# Patient Record
(demographics unavailable — no encounter records)

---

## 2024-10-07 NOTE — HISTORY OF PRESENT ILLNESS
[de-identified] : 23 year old man with h/o ETD, bilateral OME and Trisomy 21 presents for follow-up. Dad reports his hearing has remained stable. Denies current otalgia, otorrhea, recent fevers/ear infections, tinnitus, dizziness, vertigo, headaches related to hearing.

## 2024-10-07 NOTE — DATA REVIEWED
[de-identified] : Essentially moderate to mild conductive HL to WNL, AU. Note mixed component at 500Hz, AU,  Type A tymp Ad, Type B tymp As. Excellent WRS, AU.- WIPI list used.

## 2024-10-07 NOTE — REASON FOR VISIT
[Subsequent Evaluation] : a subsequent evaluation for [Parent] : parent [FreeTextEntry2] : h/o ETD and bilateral OME

## 2025-04-07 NOTE — HISTORY OF PRESENT ILLNESS
[de-identified] : 23 year old man with h/o ETD, bilateral OME and Trisomy 21 presents for follow-up. Mother reports his hearing is better. Here for wax cleaning. Denies current otalgia, otorrhea, recent fevers/ear infections, tinnitus, dizziness, vertigo, headaches related to hearing.

## 2025-04-07 NOTE — PHYSICAL EXAM
[de-identified] : wax removed AD - TM normal - AS MT in place slight perf around tube [Normal] : no abnormal secretions

## 2025-04-15 NOTE — ASSESSMENT
[FreeTextEntry1] : Patient presents with symptoms and clinical findings consistent with a likely viral URI, with no evidence of respiratory compromise or bacterial superinfection at this time. POCT Flu/COVID negative.

## 2025-04-15 NOTE — ADDENDUM
[FreeTextEntry1] : Called patient's father on 4/15/2025. RVP is negative. He reports that patient is doing significantly better since our last appointment with over the counter medications. Reinforced above plan and return precautions. All questions answered

## 2025-04-15 NOTE — PHYSICAL EXAM
[No Acute Distress] : no acute distress [Well Nourished] : well nourished [Well Developed] : well developed [Normal Sclera/Conjunctiva] : normal sclera/conjunctiva [EOMI] : extraocular movements intact [Normal Outer Ear/Nose] : the outer ears and nose were normal in appearance [Normal Oropharynx] : the oropharynx was normal [Normal TMs] : both tympanic membranes were normal [Supple] : supple [No Respiratory Distress] : no respiratory distress  [No Accessory Muscle Use] : no accessory muscle use [Clear to Auscultation] : lungs were clear to auscultation bilaterally [Soft] : abdomen soft [Non Tender] : non-tender [Non-distended] : non-distended [No Masses] : no abdominal mass palpated [Grossly Normal Strength/Tone] : grossly normal strength/tone [No Rash] : no rash [Coordination Grossly Intact] : coordination grossly intact [No Focal Deficits] : no focal deficits [Normal Gait] : normal gait [de-identified] : Tachycardia likely due to underlying URI symptoms and poor po intake, regular rhythm

## 2025-04-15 NOTE — REVIEW OF SYSTEMS
[Nasal Discharge] : nasal discharge [Cough] : cough [Abdominal Pain] : abdominal pain [Negative] : Neurological [Earache] : no earache [Sore Throat] : no sore throat [Chest Pain] : no chest pain [Shortness Of Breath] : no shortness of breath [Wheezing] : no wheezing [Nausea] : no nausea [Constipation] : no constipation [Diarrhea] : no diarrhea [Vomiting] : no vomiting [FreeTextEntry2] : See HPI  [FreeTextEntry7] : generalized abdominal discomfort  [FreeTextEntry8] : no decrease urine output

## 2025-04-15 NOTE — PLAN
[FreeTextEntry1] : #Viral URI - Follow up RVP  - Discussed the importance of oral hydration with water and oral rehydration solutions (Celalyte, Pedialyte, Enfalyte) - Recommend supportive care including rest, OTC Acetaminophen/NSAIDs PRN for pain/fever, OTC throat lozenges, warm saltwater gargles, honey prn throat discomfort, Saline nasal irrigation and steam inhalation prn congestion. Recommend taking OTC cough medication with Guaifenesin - Discussed importance of proper hygiene and transmission prevention - RTC if symptoms fail to improve - Strict ED precautions given including difficulty breathing, chest pain, persistent fever, altered mental status  - Monitor symptoms of dehydration (dry mouth, decreased urine output, dizziness)

## 2025-04-15 NOTE — HISTORY OF PRESENT ILLNESS
[FreeTextEntry8] : 23 year old with down syndrome, prediabetes, recurrent chronic otitis media here for URI symptoms x 4 days. History obtained by father as patient is nonverbal. Symptoms include productive cough, poor appetite, nasal congestion, chills, subjective fevers Tmax 99 on forehead and on stomach 103F. No sore throat, shortness of breath, chest pain, diarrhea, vomiting. He has poor oral and po intake but normal urine output.Treatments tried: a little bit of Dayquil. Sick contact: none. History of childhood asthma but no asthma symptoms during adult years.

## 2025-04-15 NOTE — PHYSICAL EXAM
[No Acute Distress] : no acute distress [Well Nourished] : well nourished [Well Developed] : well developed [Normal Sclera/Conjunctiva] : normal sclera/conjunctiva [EOMI] : extraocular movements intact [Normal Outer Ear/Nose] : the outer ears and nose were normal in appearance [Normal Oropharynx] : the oropharynx was normal [Normal TMs] : both tympanic membranes were normal [Supple] : supple [No Respiratory Distress] : no respiratory distress  [No Accessory Muscle Use] : no accessory muscle use [Clear to Auscultation] : lungs were clear to auscultation bilaterally [Soft] : abdomen soft [Non Tender] : non-tender [Non-distended] : non-distended [No Masses] : no abdominal mass palpated [Grossly Normal Strength/Tone] : grossly normal strength/tone [No Rash] : no rash [Coordination Grossly Intact] : coordination grossly intact [No Focal Deficits] : no focal deficits [Normal Gait] : normal gait [de-identified] : Tachycardia likely due to underlying URI symptoms and poor po intake, regular rhythm

## 2025-06-11 NOTE — PHYSICAL EXAM
[Normal Outer Ear/Nose] : the outer ears and nose were normal in appearance [Normal Oropharynx] : the oropharynx was normal [No Lymphadenopathy] : no lymphadenopathy [Supple] : supple [Normal] : normal rate, regular rhythm, normal S1 and S2 and no murmur heard [No Edema] : there was no peripheral edema [No Extremity Clubbing/Cyanosis] : no extremity clubbing/cyanosis [Soft] : abdomen soft [Non Tender] : non-tender [Non-distended] : non-distended [No Masses] : no abdominal mass palpated [Normal Supraclavicular Nodes] : no supraclavicular lymphadenopathy [Normal Anterior Cervical Nodes] : no anterior cervical lymphadenopathy [Normal Gait] : normal gait [Normal Affect] : the affect was normal [Alert and Oriented x3] : oriented to person, place, and time [Normal Mood] : the mood was normal [Normal Insight/Judgement] : insight and judgment were intact [de-identified] : L TM erythematous, R TM normal

## 2025-06-11 NOTE — PHYSICAL EXAM
[Normal Outer Ear/Nose] : the outer ears and nose were normal in appearance [Normal Oropharynx] : the oropharynx was normal [No Lymphadenopathy] : no lymphadenopathy [Supple] : supple [Normal] : normal rate, regular rhythm, normal S1 and S2 and no murmur heard [No Edema] : there was no peripheral edema [No Extremity Clubbing/Cyanosis] : no extremity clubbing/cyanosis [Soft] : abdomen soft [Non Tender] : non-tender [Non-distended] : non-distended [No Masses] : no abdominal mass palpated [Normal Supraclavicular Nodes] : no supraclavicular lymphadenopathy [Normal Anterior Cervical Nodes] : no anterior cervical lymphadenopathy [Normal Gait] : normal gait [Normal Affect] : the affect was normal [Alert and Oriented x3] : oriented to person, place, and time [Normal Mood] : the mood was normal [Normal Insight/Judgement] : insight and judgment were intact [de-identified] : L TM erythematous, R TM normal

## 2025-06-11 NOTE — HEALTH RISK ASSESSMENT
[No] : In the past 12 months have you used drugs other than those required for medical reasons? No [0] : 2) Feeling down, depressed, or hopeless: Not at all (0) [PHQ-2 Negative - No further assessment needed] : PHQ-2 Negative - No further assessment needed [Never] : Never [HIV test declined] : HIV test declined [Hepatitis C test declined] : Hepatitis C test declined [de-identified] : no exercise [de-identified] : healthy, gets enough fruits/vegetables [[id number]] : 0

## 2025-06-11 NOTE — HEALTH RISK ASSESSMENT
[No] : In the past 12 months have you used drugs other than those required for medical reasons? No [0] : 2) Feeling down, depressed, or hopeless: Not at all (0) [PHQ-2 Negative - No further assessment needed] : PHQ-2 Negative - No further assessment needed [Never] : Never [HIV test declined] : HIV test declined [Hepatitis C test declined] : Hepatitis C test declined [de-identified] : no exercise [de-identified] : healthy, gets enough fruits/vegetables [[id number]] : 0

## 2025-06-11 NOTE — HISTORY OF PRESENT ILLNESS
[Parent] : parent [Family Member] : family member [FreeTextEntry1] : CPE [de-identified] : Pt comes in for CPE. Pt has history of Down Syndrome.  Chronic ear infections, hearing loss: no recent infections, doing well.   Hypothyroid: on levothyroxine 100, tolerating well. Following w/ endo routinely.  Prediabetes: on metformin 750 once daily.

## 2025-06-11 NOTE — ASSESSMENT
[FreeTextEntry1] : # HM f/u AV labs Not due for cancer screening  # L otitis externa Sent in ciprodex  # Prediabetes Cont metformin 750, renewed f/u A1c  # Hypothyroid Pt's father asking for refills, doesn't remember if 100 mcg is right dosing, will return to office with bottle  # Down Syndrome Pt is in program - pt's father will bring in forms for me to fill out.  f/u in 1 year or PRN   Visit conducted as part of ongoing, longitudinal medical care for patient's medical and other issues.  [Vaccines Reviewed] : Immunizations reviewed today. Please see immunization details in the vaccine log within the immunization flowsheet.

## 2025-06-11 NOTE — HISTORY OF PRESENT ILLNESS
[Parent] : parent [Family Member] : family member [FreeTextEntry1] : CPE [de-identified] : Pt comes in for CPE. Pt has history of Down Syndrome.  Chronic ear infections, hearing loss: no recent infections, doing well.   Hypothyroid: on levothyroxine 100, tolerating well. Following w/ endo routinely.  Prediabetes: on metformin 750 once daily.